# Patient Record
Sex: FEMALE | Race: BLACK OR AFRICAN AMERICAN | NOT HISPANIC OR LATINO | Employment: UNEMPLOYED | URBAN - METROPOLITAN AREA
[De-identification: names, ages, dates, MRNs, and addresses within clinical notes are randomized per-mention and may not be internally consistent; named-entity substitution may affect disease eponyms.]

---

## 2017-08-29 ENCOUNTER — OFFICE VISIT (OUTPATIENT)
Dept: NEUROLOGY | Facility: CLINIC | Age: 52
End: 2017-08-29
Payer: COMMERCIAL

## 2017-08-29 ENCOUNTER — LAB VISIT (OUTPATIENT)
Dept: LAB | Facility: HOSPITAL | Age: 52
End: 2017-08-29
Attending: PSYCHIATRY & NEUROLOGY
Payer: COMMERCIAL

## 2017-08-29 VITALS
HEART RATE: 68 BPM | WEIGHT: 165.38 LBS | HEIGHT: 66 IN | SYSTOLIC BLOOD PRESSURE: 124 MMHG | DIASTOLIC BLOOD PRESSURE: 93 MMHG | BODY MASS INDEX: 26.58 KG/M2

## 2017-08-29 DIAGNOSIS — R53.1 WEAKNESS: ICD-10-CM

## 2017-08-29 DIAGNOSIS — R41.89 COGNITIVE CHANGE: Primary | ICD-10-CM

## 2017-08-29 DIAGNOSIS — R41.89 COGNITIVE CHANGE: ICD-10-CM

## 2017-08-29 LAB
TSH SERPL DL<=0.005 MIU/L-ACNC: 0.41 UIU/ML
VIT B12 SERPL-MCNC: 559 PG/ML

## 2017-08-29 PROCEDURE — 84443 ASSAY THYROID STIM HORMONE: CPT

## 2017-08-29 PROCEDURE — 3080F DIAST BP >= 90 MM HG: CPT | Mod: S$GLB,,, | Performed by: PSYCHIATRY & NEUROLOGY

## 2017-08-29 PROCEDURE — 99205 OFFICE O/P NEW HI 60 MIN: CPT | Mod: S$GLB,,, | Performed by: PSYCHIATRY & NEUROLOGY

## 2017-08-29 PROCEDURE — 86592 SYPHILIS TEST NON-TREP QUAL: CPT

## 2017-08-29 PROCEDURE — 84425 ASSAY OF VITAMIN B-1: CPT

## 2017-08-29 PROCEDURE — 3074F SYST BP LT 130 MM HG: CPT | Mod: S$GLB,,, | Performed by: PSYCHIATRY & NEUROLOGY

## 2017-08-29 PROCEDURE — 3008F BODY MASS INDEX DOCD: CPT | Mod: S$GLB,,, | Performed by: PSYCHIATRY & NEUROLOGY

## 2017-08-29 PROCEDURE — 82607 VITAMIN B-12: CPT

## 2017-08-29 PROCEDURE — 36415 COLL VENOUS BLD VENIPUNCTURE: CPT

## 2017-08-29 PROCEDURE — 99999 PR PBB SHADOW E&M-NEW PATIENT-LVL III: CPT | Mod: PBBFAC,,, | Performed by: PSYCHIATRY & NEUROLOGY

## 2017-08-29 RX ORDER — LORAZEPAM 1 MG/1
TABLET ORAL
Qty: 2 TABLET | Refills: 0 | Status: SHIPPED | OUTPATIENT
Start: 2017-08-29

## 2017-08-29 NOTE — PATIENT INSTRUCTIONS
Magnetic Resonance Imaging (MRI)     You will be asked to hold very still during the scan.   Magnetic resonance imaging (MRI) is a test that lets your doctor see detailed pictures of the inside of your body. MRI combines the use of strong magnets and radio waves to form an MRI image.  How do I get ready for an MRI?  · You may need to stop eating or drinking before the test. Each health care facility has its own guidelines on this. It also depends on the type of exam you are having. Ask your health care provider if you should stop eating or drinking before the test.  · Ask your provider if you should stop taking any medicine before the test.  · Follow your normal daily routine unless your provider tells you otherwise.  · You'll be asked to remove your watch, jewelry, hearing aids, credit cards, pens, pocket knives, eyeglasses, and other metal objects.  · You may be asked to remove your makeup. Makeup may contain some metal.  · Most MRI tests take 30 to 60 minutes. Depending on the type of MRI you are having, the test may take longer. Give yourself extra time to check in.     MRI uses strong magnets. Metal is affected by magnets and can distort the image. The magnet used in MRI can cause metal objects in your body to move. If you have a metal implant, you may not be able to have an MRI unless the implant is certified as MRI safe. People with these implants should not have an MRI:  · Ear (cochlear) implants  · Certain clips used for brain aneurysms  · Certain metal coils put in blood vessels  · Most defibrillators  · Most pacemakers  Be sure to tell the radiologist or technologist if you:  · Have had any previous surgeries  · Have a pacemaker, surgical clips, metal plate or pins, an artificial joint, staples or screws, ear (cochlear) implants, or other implants  · Wear a medicated adhesive patch  · Have metal splinters in your body  · Have implanted nerve stimulators or drug-infusion ports  · Have tattoos or body  piercings. Some tattoo inks contain metal.  · Work with metal  · Have braces. You must remove any dental work.  · Have a bullet or other metal in your body  Also tell the radiologist or technologist if you:  · Are pregnant or think you may be  · Are afraid of small, enclosed spaces (claustrophobic)  · Are allergic to X-ray dye (contrast medium), iodine, shellfish, or any medicines  · Have other allergies  · Are breastfeeding  · Have a history of cancer  · Have any serious health problems. This includes kidney disease or a liver transplant. You may not be able to have the contrast material used for MRI.      What happens during an MRI?  · You may be asked to wear a hospital gown.  · You may be given earplugs to wear if you need them.  · You may be injected with a special dye (contrast) that improves the MRI image.   · Youll lie down on a platform that slides into the magnet.  What happens after an MRI?  · You can get back to normal activities right away. If you were given contrast, it will pass naturally through your body within a day. You may be told to drink more water or other fluids during this time.   · Your doctor will discuss the test results with you during a follow-up appointment or over the phone.  · Your next appointment is: __________________  Date Last Reviewed: 6/2/2015  © 0267-8573 [employer]. [address] [place] [address], [place] [address]. All rights reserved. This information is not intended as a substitute for professional medical care. Always follow your healthcare professional's instructions.

## 2017-08-29 NOTE — PROGRESS NOTES
[facility] - NEUROLOGY EPILEPSY  [facility] [place]    Date: August 29, 2017   Patient Name: Rebecca Colby   MRN: [id number]   PCP: David Mane  Referring Provider: Self, Aaareferral    Assessment:   Rebecca Colby is a 52 y.o. female presenting with cognitive changes, reported head drop and progressive daily muscle weakness/fatigue, depression, and hypertonia.  Will obtain new MRI brain for evaluation of reported prior stroke versus other underlying lesion.  We will also initiate screening labs as below to include myasthenia gravis panel.  Will also attempt to get copy of patient's prior MRI. Pending results of initial workup will likely consider referral to neuropsychiatry as well as possible LP versus expanded lab evaluation. Of note, TSH and PTH last checked 6 months ago and WNL.    Plan:     Problem List Items Addressed This Visit     None      Visit Diagnoses     Cognitive change    -  Primary    Relevant Orders    Vitamin B12 (Completed)    TSH (Completed)    RPR    MRI Brain W WO Contrast    Vitamin B1    Weakness        Relevant Orders    MYASTHENIA GRAVIS PANEL, ADULT          Mario Cartwright MD  [facility]   Department of Neurology    Patient note was created using Dragon Dictation.  Any errors in syntax or even information may not have been identified and edited on initial review prior to signing this note.  Subjective:        HPI:   Ms. Rebecca Colby is a 52 y.o. female who presents with a chief complaint of memory changes and weakness.  The patient presents with her daughter and  who contribute to the history.  The patient, who has a history of both breast cancer and lymphoma states that over the past several months, she feels as though her speech becomes more slurred throughout the day.  She states that she often feels as though she is going to stumble but is able to catch herself when she is walking.  She denies any actual falls , but  states that she feels stiff all over.  She does state that she is often leaning on objects for support.  She states that the end of the day, her muscles feel heavy and her head drops forward onto her chest and feels too heavy to lift.  She feels as though all of her muscles are exhausted at the end of the day, but denies fatigable chewing, dysarthria, dysphonia, dysphagia, diplopia, and ptosis.  She also endorses stress incontinence, stating that she occasionally urinates on herself when she is coughing or laughing.  Her daughter states that she does seem depressed and also notes that she has began laughing inappropriately and for no apparent reason.  She has established a relationship with a counselor recently as she has become more depressed and anxious and has stopped engaging in activities she previously enjoyed.  She is no longer driving with her daughter stating that she would most certainly get lost or involved in an accident if she did in both her daughter and  note that she has not been attending to the majority of her household tasks such as banking and other ADLs for several years, stating that she is too disorganized to complete most tasks.  He denies any family history of memory or mood disorders.  She does state that she underwent some form of brain scan approximately a year ago outside hospital.  She states that there was a question of a stroke on the MRI however, she is uncertain of the actual results.      PAST MEDICAL HISTORY:  Past Medical History:   Diagnosis Date    Breast cancer, left breast 04/2016    had lumpectomy    Cancer     neck s/p resectionopleted a course of chemo    Hypertension     Lymphoma 2007    had surgery & chemo at [facility]    Thyroid disease        PAST SURGICAL HISTORY:  Past Surgical History:   Procedure Laterality Date    BREAST LUMPECTOMY Left 04/2016    NECK SURGERY      resection of lymphoma    TUBAL LIGATION      tumerectomy      neck       CURRENT  "MEDS:  Current Outpatient Prescriptions   Medication Sig Dispense Refill    amlodipine (NORVASC) 5 MG tablet Take 1 tablet (5 mg total) by mouth once daily. 90 tablet 0    levothyroxine (SYNTHROID) 50 MCG tablet Take 1 tablet (50 mcg total) by mouth once daily. 90 tablet 0    losartan (COZAAR) 50 MG tablet Take 1 tablet (50 mg total) by mouth once daily. 90 tablet 0    lorazepam (ATIVAN) 1 MG tablet 1 tab 30 min prior to MRI, may repeat x1. Do not drive while taking this medication. 2 tablet 0     No current facility-administered medications for this visit.        ALLERGIES:  Review of patient's allergies indicates:   Allergen Reactions    Vicodin [hydrocodone-acetaminophen]        FAMILY HISTORY:  Family History   Problem Relation Age of Onset    Vision loss Father        SOCIAL HISTORY:  Social History   Substance Use Topics    Smoking status: Never Smoker    Smokeless tobacco: Never Used    Alcohol use No       Review of Systems:  12 review of systems is negative except for the symptoms mentioned in HPI.      Objective:     Vitals:    08/29/17 1325   BP: (!) 124/93   Pulse: 68   Weight: 75 kg (165 lb 5.5 oz)   Height: 5' 6" (1.676 m)     General: NAD, well nourished   Eyes: no tearing, discharge, no erythema   ENT: moist mucous membranes of the oral cavity, nares patent    Neck: Supple, full range of motion  Cardiovascular: Warm and well perfused, pulses equal and symmetrical  Lungs: Normal work of breathing, normal chest wall excursions  Skin: No rash, lesions, or breakdown on exposed skin  Psychiatry: Mood and affect are appropriate   Abdomen: soft, non tender, non distended  Extremeties: No cyanosis, clubbing or edema.    Neurological   MENTAL STATUS: Alert and oriented to person, place but not time. Attention and concentration within normal limits. Laughs inappropriately. Speech without dysarthria. Recent and remote memory within normal limits   CRANIAL NERVES: Visual fields intact. PERRL. EOMI. " Facial sensation intact. Face symmetrical. Hearing grossly intact. Full shoulder shrug bilaterally. Tongue protrudes midline   SENSORY: Sensation is intact to light touch throughout.   MOTOR: Normal bulk and increased tone in LUE.  5/5 deltoid, biceps, triceps, interosseous, hand  bilaterally. 5/5 iliopsoas, knee extension/flexion, foot dorsi/plantarflexion bilaterally.    REFLEXES: Symmetric and 2+ throughout.    CEREBELLAR/COORDINATION/GAIT: Gait steady with normal arm swing and stride length.  Heel to shin intact. Finger to nose intact. Anamika slowed bilaterally

## 2017-08-30 LAB — RPR SER QL: NORMAL

## 2017-09-01 ENCOUNTER — TELEPHONE (OUTPATIENT)
Dept: NEUROLOGY | Facility: CLINIC | Age: 52
End: 2017-09-01

## 2017-09-01 LAB — VIT B1 SERPL-MCNC: 62 UG/L (ref 38–122)

## 2017-09-07 ENCOUNTER — HOSPITAL ENCOUNTER (OUTPATIENT)
Dept: RADIOLOGY | Facility: HOSPITAL | Age: 52
Discharge: HOME | End: 2017-09-07
Attending: PSYCHIATRY & NEUROLOGY
Payer: COMMERCIAL

## 2017-09-07 DIAGNOSIS — R41.89 COGNITIVE CHANGE: ICD-10-CM

## 2017-09-07 PROCEDURE — 25500020 PHARM REV CODE 255: Performed by: PSYCHIATRY & NEUROLOGY

## 2017-09-07 PROCEDURE — 70553 MRI BRAIN STEM W/O & W/DYE: CPT | Mod: TC

## 2017-09-07 PROCEDURE — 70553 MRI BRAIN STEM W/O & W/DYE: CPT | Mod: 26,,, | Performed by: RADIOLOGY

## 2017-09-07 PROCEDURE — A9585 GADOBUTROL INJECTION: HCPCS | Performed by: PSYCHIATRY & NEUROLOGY

## 2017-09-07 RX ORDER — GADOBUTROL 604.72 MG/ML
8 INJECTION INTRAVENOUS
Status: COMPLETED | OUTPATIENT
Start: 2017-09-07 | End: 2017-09-07

## 2017-09-07 RX ADMIN — GADOBUTROL 8 ML: 604.72 INJECTION INTRAVENOUS at 06:09

## 2017-10-03 ENCOUNTER — OFFICE VISIT (OUTPATIENT)
Dept: NEUROLOGY | Facility: CLINIC | Age: 52
End: 2017-10-03
Payer: COMMERCIAL

## 2017-10-03 ENCOUNTER — LAB VISIT (OUTPATIENT)
Dept: LAB | Facility: HOSPITAL | Age: 52
End: 2017-10-03
Attending: PSYCHIATRY & NEUROLOGY
Payer: COMMERCIAL

## 2017-10-03 VITALS
SYSTOLIC BLOOD PRESSURE: 130 MMHG | HEART RATE: 68 BPM | DIASTOLIC BLOOD PRESSURE: 91 MMHG | HEIGHT: 66 IN | WEIGHT: 162.69 LBS | BODY MASS INDEX: 26.14 KG/M2

## 2017-10-03 DIAGNOSIS — R53.1 SUBJECTIVE WEAKNESS: ICD-10-CM

## 2017-10-03 DIAGNOSIS — F33.9 EPISODE OF RECURRENT MAJOR DEPRESSIVE DISORDER, UNSPECIFIED DEPRESSION EPISODE SEVERITY: ICD-10-CM

## 2017-10-03 DIAGNOSIS — G70.00 MYASTHENIA GRAVIS: Primary | ICD-10-CM

## 2017-10-03 PROCEDURE — 83519 RIA NONANTIBODY: CPT

## 2017-10-03 PROCEDURE — 36415 COLL VENOUS BLD VENIPUNCTURE: CPT

## 2017-10-03 PROCEDURE — 99999 PR PBB SHADOW E&M-EST. PATIENT-LVL III: CPT | Mod: PBBFAC,,, | Performed by: PSYCHIATRY & NEUROLOGY

## 2017-10-03 PROCEDURE — 99214 OFFICE O/P EST MOD 30 MIN: CPT | Mod: S$GLB,,, | Performed by: PSYCHIATRY & NEUROLOGY

## 2017-10-03 NOTE — PROGRESS NOTES
[facility] - NEUROLOGY EPILEPSY  [facility] [place]    Date: October 3, 2017   Patient Name: Rebecca Colby   MRN: [id number]   PCP: David Mane  Referring Provider: David Mane MD    Assessment:   Rebecca Colby is a 52 y.o. female presenting in follow-up for transient cognitive changes, transient progressive muscle weakness, and transient hypertonia, all of which have largely fully resolved.  I personally reviewed the patient's MRI brain and reviewed with the patient and found to be normal.  Screening labs for cognitive changes including B12, TSH, RPR, and B1 were all within normal limits.  For unclear reasons, the patient's myasthenia panel was not completed.  We will reorder this today.  The patient endorses significant psychosocial stressors and states that her symptoms seem to correlate drastically with her symptoms.  She is now following with a counselor and states that she plans to discuss antidepressant therapy with her PCP in the near future.    Plan:     Problem List Items Addressed This Visit        Psychiatric    Episode of recurrent major depressive disorder    Current Assessment & Plan     -- patient following with counselor  -- patient to talk to PCP about resuming an antidepressant            Other    Subjective weakness    Relevant Orders    MYASTHENIA GRAVIS PANEL, ADULT      Other Visit Diagnoses    None.       Mario Cartwright MD  [facility]   Department of Neurology    Patient note was created using Dragon Dictation.  Any errors in syntax or even information may not have been identified and edited on initial review prior to signing this note.  Subjective:        HPI:   Ms. Rebecca Colby is a 52 y.o. female who presents with a chief complaint of memory changes and weakness. The patient presents today with her daughter who contributes to the history. Please see my previous note for details regarding the  patient's initial presentation.  Together they report that the patient has markedly improved since her last visit.  They state that the patient's memory seems intact and she has been able to remember both recent and remote details that other family members have not been able to recall.  She is not having any difficulty caring for herself. She states her progressive weakness has largely resolved and denies any further episodes of head drop Her daughter reports that she is intermittently asking for help when dressing such as putting on a jacket but most of the time she is able to do it independently. 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 She also cites her history of breast cancer and her daughter's diagnosis of breast cancer as recent stressors. She has also recently started seeing a marriage counselor and she and her daughter state they have considered having her talk to her PCP about restarting an antidepressant, which she has taken in the past.     PAST MEDICAL HISTORY:  Past Medical History:   Diagnosis Date    Breast cancer, left breast 04/2016    had lumpectomy    Cancer     neck s/p resectionopleted a course of chemo    Hypertension     Lymphoma 2007    had surgery & chemo at [facility]    Thyroid disease        PAST SURGICAL HISTORY:  Past Surgical History:   Procedure Laterality Date    BREAST LUMPECTOMY Left 04/2016    NECK SURGERY      resection of lymphoma    TUBAL LIGATION      tumerectomy      neck       CURRENT MEDS:  Current Outpatient Prescriptions   Medication Sig Dispense Refill    amlodipine (NORVASC) 5 MG tablet Take 1 tablet (5 mg total) by mouth once daily. 90 tablet 0    levothyroxine (SYNTHROID) 50 MCG tablet Take 1 tablet (50 mcg total) by mouth once daily. 90 tablet 0    losartan (COZAAR) 50 MG tablet Take 1 tablet (50 mg total) by mouth once daily. 90 tablet 0  "   lorazepam (ATIVAN) 1 MG tablet 1 tab 30 min prior to MRI, may repeat x1. Do not drive while taking this medication. 2 tablet 0     No current facility-administered medications for this visit.      ALLERGIES:  Review of patient's allergies indicates:   Allergen Reactions    Vicodin [hydrocodone-acetaminophen]        FAMILY HISTORY:  Family History   Problem Relation Age of Onset    Vision loss Father        SOCIAL HISTORY:  Social History   Substance Use Topics    Smoking status: Never Smoker    Smokeless tobacco: Never Used    Alcohol use No       Review of Systems:  12 review of systems is negative except for the symptoms mentioned in HPI.      Objective:     Vitals:    10/03/17 1358   BP: (!) 130/91   Pulse: 68   Weight: 73.8 kg (162 lb 11.2 oz)   Height: 5' 6" (1.676 m)     General: NAD, well nourished   Eyes: no tearing, discharge, no erythema   ENT: moist mucous membranes of the oral cavity, nares patent    Neck: Supple, full range of motion  Cardiovascular: Warm and well perfused, pulses equal and symmetrical  Lungs: Normal work of breathing, normal chest wall excursions  Skin: No rash, lesions, or breakdown on exposed skin  Psychiatry: Mood and affect are appropriate   Abdomen: soft, non tender, non distended  Extremeties: No cyanosis, clubbing or edema.    Neurological   MENTAL STATUS: Alert and oriented to person, place but not time. Attention and concentration within normal limits. Laughs inappropriately. Speech without dysarthria. Recent and remote memory within normal limits   CRANIAL NERVES: Visual fields intact. PERRL. EOMI. Facial sensation intact. Face symmetrical. Hearing grossly intact. Full shoulder shrug bilaterally. Tongue protrudes midline   SENSORY: Sensation is intact to light touch throughout.   MOTOR: Normal bulk and increased tone in LUE.  5/5 deltoid, biceps, triceps, interosseous, hand  bilaterally. 5/5 iliopsoas, knee extension/flexion, foot dorsi/plantarflexion " bilaterally.    REFLEXES: Symmetric and 2+ throughout.    CEREBELLAR/COORDINATION/GAIT: Gait steady with normal arm swing and stride length.  Heel to shin intact. Finger to nose intact. Anamika slowed bilaterally

## 2017-10-03 NOTE — PATIENT INSTRUCTIONS
Manage Stress with a Healthy Lifestyle  Managing stress is easier if you take good care of yourself. Make time for rest and recreation. Eat healthier meals. Take a walk now and then. And dont forget to treat yourself. A little down time can go a long way.    Get enough rest  When you dont get enough sleep, you may be too tired to cope with stress. Also, stress can prevent you from sleeping well or may keep you awake. If this happens to you, try reading or listening to soothing music before you go to sleep.  Make time for yourself  In todays world, there is often too much to do in too little time. It may seem hard to make time for yourself. But try to spend just a few minutes each day doing something you enjoy. This can improve the quality of your life and your mental outlook. Also, youll be more productive when handling your day-to-day duties. And youll be in a better frame of mind to cope with stress.  Eat right  Its easy to react to stress by reaching for a bag of chips or a cup of coffee. This may give you a quick boost but may later drain your energy. To keep your energy level steady, eat healthy meals and snacks at home and at work. Try not to skip meals. Stick with a low-fat diet thats rich in whole grains and fresh fruits and vegetables.  Nourish your spirit  When life is hectic, its easy to forget what your values and goals are. To help prevent this from happening, find out what is most important in your life. Ask yourself, What would I miss most if I had to start a new life alone somewhere else? My work? My family or friends? Something I love doing? Then focus on embracing your values and what you want to achieve in your life.  Stay on the move  Exercise helps burn off the negative energy of stress. Doing something active that you enjoy also helps you get away from stressful situations. Try to walk, jog, skate, swim, dance, take a fitness class, or play a team sport on most days. Or practice  yoga or nancy chi, which can help you relax.  Put some fun into your life  Some things you may enjoy doing may be listed below. If not, try some of these. Then add your own.  · Go see a movie  · Have lunch with a friend  · Learn a new sport or game  · Plan a fun trip  · Take a class on something you always wanted to learn  · Try a new hobby   Date Last Reviewed: 1/18/2017  © 9143-6811 [employer]. [address] [place] [address], [place] [address]. All rights reserved. This information is not intended as a substitute for professional medical care. Always follow your healthcare professional's instructions.

## 2017-10-03 NOTE — LETTER
October 3, 2017      David Mane MD  [address] [place] [address]           [facility] - Neurology Epilepsy  [address], [address]  [facility] [address]  Phone: [phone]  Fax: [phone]          Patient: Rebecca Colby   MR Number: [id number]   YOB: 1965   Date of Visit: 10/3/2017       Dear Dr. David Mane:    Thank you for referring Rebecca Colby to me for evaluation. Attached you will find relevant portions of my assessment and plan of care.    If you have questions, please do not hesitate to call me. I look forward to following Rebecca Colby along with you.    Sincerely,    Mario Cartwright MD    Enclosure  CC:  No Recipients    If you would like to receive this communication electronically, please contact [email or web address] or [phone] to request more information on [employer] access.    For providers and/or their staff who would like to refer a patient to [facility], please contact us through our one-stop-shop provider referral line, [facility], at [phone].    If you feel you have received this communication in error or would no longer like to receive these types of communications, please e-mail [email or web address]

## 2017-10-09 LAB
ACHR BIND AB SER-SCNC: 0.4 NMOL/L
ACHR MOD AB/ACHR TOTAL SFR SER: 0 %
STRIA MUS AB TITR SER: NEGATIVE TITER
VGCC-N BIND AB SER-SCNC: ABNORMAL PMOL/L

## 2017-10-19 ENCOUNTER — TELEPHONE (OUTPATIENT)
Dept: NEUROLOGY | Facility: CLINIC | Age: 52
End: 2017-10-19

## 2017-10-19 NOTE — TELEPHONE ENCOUNTER
I called the patient and left a message for the patient to reschedule the test that were ordered by Dr Cartwright at a [facility] appointment.

## 2017-11-01 ENCOUNTER — PROCEDURE VISIT (OUTPATIENT)
Dept: NEUROLOGY | Facility: CLINIC | Age: 52
End: 2017-11-01
Payer: COMMERCIAL

## 2017-11-01 DIAGNOSIS — G70.00 MYASTHENIA GRAVIS: ICD-10-CM

## 2017-11-01 PROCEDURE — 95937 NEUROMUSCULAR JUNCTION TEST: CPT | Mod: S$GLB,,, | Performed by: PSYCHIATRY & NEUROLOGY

## 2017-11-01 RX ORDER — PYRIDOSTIGMINE BROMIDE 60 MG/1
60 TABLET ORAL EVERY 4 HOURS
Qty: 90 TABLET | Refills: 3 | Status: SHIPPED | OUTPATIENT
Start: 2017-11-01 | End: 2024-02-05

## 2017-11-01 NOTE — PROCEDURES
Procedures   Please see scanned NCS/EMG procedure note    Benito Limon MD  [facility]

## 2023-05-08 ENCOUNTER — OFFICE VISIT (OUTPATIENT)
Dept: PODIATRY | Facility: CLINIC | Age: 58
End: 2023-05-08
Payer: COMMERCIAL

## 2023-05-08 VITALS
SYSTOLIC BLOOD PRESSURE: 135 MMHG | WEIGHT: 168.31 LBS | HEIGHT: 65 IN | HEART RATE: 91 BPM | DIASTOLIC BLOOD PRESSURE: 90 MMHG | BODY MASS INDEX: 28.04 KG/M2

## 2023-05-08 DIAGNOSIS — L29.9 ITCHING: ICD-10-CM

## 2023-05-08 DIAGNOSIS — B35.1 ONYCHOMYCOSIS OF TOENAIL: ICD-10-CM

## 2023-05-08 DIAGNOSIS — B35.3 TINEA PEDIS, UNSPECIFIED LATERALITY: Primary | ICD-10-CM

## 2023-05-08 PROBLEM — Z85.3 HISTORY OF BREAST CANCER: Status: ACTIVE | Noted: 2018-12-11

## 2023-05-08 PROBLEM — G20.A1 PARKINSON'S DISEASE: Status: ACTIVE | Noted: 2022-10-28

## 2023-05-08 PROBLEM — Z17.0 ESTROGEN RECEPTOR POSITIVE: Status: ACTIVE | Noted: 2019-01-07

## 2023-05-08 PROBLEM — Z85.71 HISTORY OF HODGKIN'S LYMPHOMA: Status: ACTIVE | Noted: 2018-12-11

## 2023-05-08 PROCEDURE — 99999 PR PBB SHADOW E&M-NEW PATIENT-LVL III: ICD-10-PCS | Mod: PBBFAC,,, | Performed by: PODIATRIST

## 2023-05-08 PROCEDURE — 3075F SYST BP GE 130 - 139MM HG: CPT | Mod: CPTII,S$GLB,, | Performed by: PODIATRIST

## 2023-05-08 PROCEDURE — 3075F PR MOST RECENT SYSTOLIC BLOOD PRESS GE 130-139MM HG: ICD-10-PCS | Mod: CPTII,S$GLB,, | Performed by: PODIATRIST

## 2023-05-08 PROCEDURE — 3008F BODY MASS INDEX DOCD: CPT | Mod: CPTII,S$GLB,, | Performed by: PODIATRIST

## 2023-05-08 PROCEDURE — 3080F PR MOST RECENT DIASTOLIC BLOOD PRESSURE >= 90 MM HG: ICD-10-PCS | Mod: CPTII,S$GLB,, | Performed by: PODIATRIST

## 2023-05-08 PROCEDURE — 1159F PR MEDICATION LIST DOCUMENTED IN MEDICAL RECORD: ICD-10-PCS | Mod: CPTII,S$GLB,, | Performed by: PODIATRIST

## 2023-05-08 PROCEDURE — 99202 OFFICE O/P NEW SF 15 MIN: CPT | Mod: S$GLB,,, | Performed by: PODIATRIST

## 2023-05-08 PROCEDURE — 4010F PR ACE/ARB THEARPY RXD/TAKEN: ICD-10-PCS | Mod: CPTII,S$GLB,, | Performed by: PODIATRIST

## 2023-05-08 PROCEDURE — 4010F ACE/ARB THERAPY RXD/TAKEN: CPT | Mod: CPTII,S$GLB,, | Performed by: PODIATRIST

## 2023-05-08 PROCEDURE — 1159F MED LIST DOCD IN RCRD: CPT | Mod: CPTII,S$GLB,, | Performed by: PODIATRIST

## 2023-05-08 PROCEDURE — 3008F PR BODY MASS INDEX (BMI) DOCUMENTED: ICD-10-PCS | Mod: CPTII,S$GLB,, | Performed by: PODIATRIST

## 2023-05-08 PROCEDURE — 99999 PR PBB SHADOW E&M-NEW PATIENT-LVL III: CPT | Mod: PBBFAC,,, | Performed by: PODIATRIST

## 2023-05-08 PROCEDURE — 99202 PR OFFICE/OUTPT VISIT, NEW, LEVL II, 15-29 MIN: ICD-10-PCS | Mod: S$GLB,,, | Performed by: PODIATRIST

## 2023-05-08 PROCEDURE — 3080F DIAST BP >= 90 MM HG: CPT | Mod: CPTII,S$GLB,, | Performed by: PODIATRIST

## 2023-05-08 RX ORDER — HYDROCHLOROTHIAZIDE 12.5 MG/1
12.5 TABLET ORAL
COMMUNITY
Start: 2023-03-08

## 2023-05-08 RX ORDER — CARBIDOPA AND LEVODOPA 25; 100 MG/1; MG/1
1 TABLET ORAL 3 TIMES DAILY
COMMUNITY
Start: 2023-04-11

## 2023-05-08 RX ORDER — CLOTRIMAZOLE AND BETAMETHASONE DIPROPIONATE 10; .64 MG/G; MG/G
CREAM TOPICAL 2 TIMES DAILY
Qty: 45 G | Refills: 2 | Status: SHIPPED | OUTPATIENT
Start: 2023-05-08

## 2023-05-08 NOTE — PATIENT INSTRUCTIONS
For your fungal(mycotic) nails, pick one & use for at least 3-6 months:    1. Listerine mouthwash (yellow/gold) - soak for 5-10minutes daily (may re-use solution up to a week)    2. Vicks Vaporub to nails daily after a bath/end of day/bedtime.    3. Lamisil (terbanafine) 1% antifungal cream daily to nails after shower.         Gentian violet between toes daily after bathing until resolved (followed by Lotrisjenifer), then weekly only

## 2023-05-08 NOTE — PROGRESS NOTES
Subjective:      Patient ID: Rebecca Colby is a 58 y.o. female.    Chief Complaint: Foot Problem (Fungus Bi-Lat )    Rebecca is a 58 y.o. female who presents to the clinic complaining of thick and dark discolored toenails on both feet as well as itching between the toes @ night. Rebecca is inquiring about treatment options.    PCP: Simeon Onofre MD 3 months ago     Past Medical History:   Diagnosis Date    Breast cancer, left breast 04/2016    had lumpectomy    Cancer     neck s/p resectionopleted a course of chemo    Hypertension     Lymphoma 2007    had surgery & chemo at [facility]    Thyroid disease      Patient Active Problem List   Diagnosis    Lymphoma    Breast cancer    Essential hypertension    Acquired hypothyroidism    Subjective weakness    Episode of recurrent major depressive disorder    Estrogen receptor positive    History of breast cancer    History of Hodgkin's lymphoma    Parkinson's disease      Objective:      Review of Systems   Constitutional: Negative for malaise/fatigue.   Skin:  Positive for color change, dry skin, itching, nail changes and rash. Negative for suspicious lesions.   Musculoskeletal:  Negative for falls, joint pain and myalgias.   Psychiatric/Behavioral:  The patient is not nervous/anxious.    Physical Exam  Vitals reviewed.   Constitutional:       General: She is not in acute distress.     Appearance: She is well-developed and overweight.   Cardiovascular:      Pulses:           Dorsalis pedis pulses are 2+ on the right side and 2+ on the left side.   Feet:      Right foot:      Skin integrity: Dry skin present.      Toenail Condition: Fungal disease present.     Left foot:      Skin integrity: Dry skin present.      Toenail Condition: Fungal disease present.     Comments: Toenails are thickened, discolored dark, w/ crumbly subungual debris sparing 3rd B/L and 2nd.   Skin:     General: Skin is warm and dry.      Capillary Refill: Capillary refill takes less than 2  seconds.      Findings: Rash present. No bruising, erythema or lesion.      Comments: Mild scaling and maceration intertriginous spaces B/L.   Neurological:      Mental Status: She is alert and oriented to person, place, and time.      Sensory: No sensory deficit.      Motor: No weakness.      Gait: Gait normal.   Psychiatric:         Mood and Affect: Mood and affect normal. Mood is not anxious.         Behavior: Behavior is cooperative.       Assessment:      Encounter Diagnoses   Name Primary?    Tinea pedis, unspecified laterality Yes    Itching     Onychomycosis of toenail      Problem List Items Addressed This Visit    None  Visit Diagnoses       Tinea pedis, unspecified laterality    -  Primary    Relevant Medications    clotrimazole-betamethasone 1-0.05% (LOTRISONE) cream    Itching        Relevant Medications    clotrimazole-betamethasone 1-0.05% (LOTRISONE) cream    Onychomycosis of toenail                 Plan:       Rebecca was seen today for foot problem.    Diagnoses and all orders for this visit:    Tinea pedis, unspecified laterality  -     clotrimazole-betamethasone 1-0.05% (LOTRISONE) cream; Apply topically 2 (two) times daily.    Itching  -     clotrimazole-betamethasone 1-0.05% (LOTRISONE) cream; Apply topically 2 (two) times daily.    Onychomycosis of toenail    I counseled the patient on her conditions, their implications and medical management.    - Shoe inspection. Patient instructed on proper foot hygeine.   Instructions provided on OTC topical antifungal treatment options for her nails and skin.  Gentian violet applied to the webspace is and patient may continue to do this daily after bathing (after which she may apply weekly p.r.n.)        A total of 19 mins.was spent on chart review, patient visit & documentation.

## 2024-02-05 ENCOUNTER — OFFICE VISIT (OUTPATIENT)
Dept: OBSTETRICS AND GYNECOLOGY | Facility: CLINIC | Age: 59
End: 2024-02-05
Payer: COMMERCIAL

## 2024-02-05 VITALS
WEIGHT: 160.5 LBS | DIASTOLIC BLOOD PRESSURE: 103 MMHG | BODY MASS INDEX: 26.74 KG/M2 | HEIGHT: 65 IN | SYSTOLIC BLOOD PRESSURE: 136 MMHG

## 2024-02-05 DIAGNOSIS — Z12.4 SCREENING FOR CERVICAL CANCER: ICD-10-CM

## 2024-02-05 DIAGNOSIS — Z01.419 WELL WOMAN EXAM WITH ROUTINE GYNECOLOGICAL EXAM: Primary | ICD-10-CM

## 2024-02-05 PROCEDURE — 87624 HPV HI-RISK TYP POOLED RSLT: CPT | Performed by: OBSTETRICS & GYNECOLOGY

## 2024-02-05 PROCEDURE — 3080F DIAST BP >= 90 MM HG: CPT | Mod: CPTII,S$GLB,, | Performed by: OBSTETRICS & GYNECOLOGY

## 2024-02-05 PROCEDURE — 99999 PR PBB SHADOW E&M-EST. PATIENT-LVL III: CPT | Mod: PBBFAC,,, | Performed by: OBSTETRICS & GYNECOLOGY

## 2024-02-05 PROCEDURE — 99386 PREV VISIT NEW AGE 40-64: CPT | Mod: S$GLB,,, | Performed by: OBSTETRICS & GYNECOLOGY

## 2024-02-05 PROCEDURE — 3008F BODY MASS INDEX DOCD: CPT | Mod: CPTII,S$GLB,, | Performed by: OBSTETRICS & GYNECOLOGY

## 2024-02-05 PROCEDURE — 1159F MED LIST DOCD IN RCRD: CPT | Mod: CPTII,S$GLB,, | Performed by: OBSTETRICS & GYNECOLOGY

## 2024-02-05 PROCEDURE — 88175 CYTOPATH C/V AUTO FLUID REDO: CPT | Performed by: OBSTETRICS & GYNECOLOGY

## 2024-02-05 PROCEDURE — 3075F SYST BP GE 130 - 139MM HG: CPT | Mod: CPTII,S$GLB,, | Performed by: OBSTETRICS & GYNECOLOGY

## 2024-02-05 PROCEDURE — 4010F ACE/ARB THERAPY RXD/TAKEN: CPT | Mod: CPTII,S$GLB,, | Performed by: OBSTETRICS & GYNECOLOGY

## 2024-02-05 RX ORDER — INFLUENZA VIRUS VACCINE 15; 15; 15; 15 UG/.5ML; UG/.5ML; UG/.5ML; UG/.5ML
SUSPENSION INTRAMUSCULAR
COMMUNITY
Start: 2023-11-10

## 2024-02-05 NOTE — PROGRESS NOTES
"  Subjective:      Patient ID: Rebecca Colby is a 58 y.o. female.    Chief Complaint:  Well Woman      History of Present Illness  HPI  57yo  presents for annual exam.  No complaints today, but states her PCP told her to see a gyn due to low blood count and abnormal thyroid.  Does not have records with her, and none available through [facility] Everywhere.  Menopausal since age 50.  No PMB.  Sexually active without complaints. Occ constipation, relieved with diet changes.  Last pap over 5 years ago.  MMG at the end of Feb (with PCP, will request records).     Elevated BP in clinic 157/107, did not take evening Norvasc the last 2 nights due to forgetting, has been dealing with death of several loved ones recently.     GYN & OB History  No LMP recorded. (Menstrual status: Other).   Date of Last Pap: No result found    OB History    Para Term  AB Living   2 2 2     2   SAB IAB Ectopic Multiple Live Births           2      # Outcome Date GA Lbr Joshua/2nd Weight Sex Delivery Anes PTL Lv   2 Term     F Vag-Spont   SHARMAINE   1 Term     M Vag-Spont   SHARMAINE       Review of Systems  Review of Systems   Constitutional:  Negative for chills and fever.   HENT:  Negative for nasal congestion.    Respiratory:  Negative for cough and shortness of breath.    Cardiovascular:  Negative for chest pain.   Gastrointestinal:  Positive for constipation (occ). Negative for abdominal pain and diarrhea.   Genitourinary:  Negative for dyspareunia, vaginal discharge, postmenopausal bleeding and vaginal odor.   Musculoskeletal:  Negative for myalgias.   Integumentary:  Negative for rash.   Breast: Negative for breast self exam         Objective:     Physical Exam    BP (!) 136/103   Ht 5' 5" (1.651 m)   Wt 72.8 kg (160 lb 7.9 oz)   BMI 26.71 kg/m²     Gen: NAD  Resp: Normal respiratory effort  Breast: Symmetric, nontender.  No masses.  No skin changes.  No nipple discharge.   Abd: soft, NT  Pelvic: Normal-appearing external " female genitalia.  No CMT.  Uterus small, mobile, nontender.  No adnexal masses or tenderness.    Ext: normal ROM  Psych: appropriate affect  Neuro: grossly intact    Assessment:     Rebecca was seen today for well woman.    Diagnoses and all orders for this visit:    Well woman exam with routine gynecological exam    Screening for cervical cancer  -     Liquid-Based Pap Smear, Screening  -     HPV High Risk Genotypes, PCR           Plan:     Routine annual exam with pap smear and breast exam today.  Declines STD screening, states done with PCP.  Will request records from PCP regarding labs, but discussed exam and no h/o vaginal bleeding - low blood count not likely due to gyn pathology.  Encouraged f/u with PCP, possible GI referral.  Up to date on screening colonoscopy.   Counseling done, precautions given, all questions answered.  RTC 1 year, or prn.

## 2024-02-08 LAB
FINAL PATHOLOGIC DIAGNOSIS: NORMAL
Lab: NORMAL

## 2024-02-21 LAB
HPV HR 12 DNA SPEC QL NAA+PROBE: NEGATIVE
HPV16 AG SPEC QL: NEGATIVE
HPV18 DNA SPEC QL NAA+PROBE: NEGATIVE

## 2025-02-18 ENCOUNTER — TELEPHONE (OUTPATIENT)
Facility: CLINIC | Age: 60
End: 2025-02-18
Payer: COMMERCIAL

## 2025-02-18 NOTE — TELEPHONE ENCOUNTER
----- Message from Kaley sent at 2/17/2025  2:24 PM CST -----  Name of Who is Calling: Marcelo ( daughter)  What is the request in detail: Calling to set up a appt with DR. Trejo Please give the daughter a call back for scheduling.   Can the clinic reply by [facility]:  What Number to Call Back if not in [facility]: [phone]

## 2025-03-17 ENCOUNTER — OFFICE VISIT (OUTPATIENT)
Facility: CLINIC | Age: 60
End: 2025-03-17
Payer: COMMERCIAL

## 2025-03-17 VITALS
WEIGHT: 160 LBS | HEIGHT: 66 IN | DIASTOLIC BLOOD PRESSURE: 92 MMHG | SYSTOLIC BLOOD PRESSURE: 131 MMHG | BODY MASS INDEX: 25.71 KG/M2 | HEART RATE: 74 BPM

## 2025-03-17 DIAGNOSIS — G20.A1 PARKINSON'S DISEASE WITHOUT DYSKINESIA OR FLUCTUATING MANIFESTATIONS: Primary | ICD-10-CM

## 2025-03-17 DIAGNOSIS — R49.8 HYPOPHONIA: ICD-10-CM

## 2025-03-17 PROCEDURE — 3008F BODY MASS INDEX DOCD: CPT | Mod: CPTII,S$GLB,, | Performed by: STUDENT IN AN ORGANIZED HEALTH CARE EDUCATION/TRAINING PROGRAM

## 2025-03-17 PROCEDURE — 1159F MED LIST DOCD IN RCRD: CPT | Mod: CPTII,S$GLB,, | Performed by: STUDENT IN AN ORGANIZED HEALTH CARE EDUCATION/TRAINING PROGRAM

## 2025-03-17 PROCEDURE — G2211 COMPLEX E/M VISIT ADD ON: HCPCS | Mod: S$GLB,,, | Performed by: STUDENT IN AN ORGANIZED HEALTH CARE EDUCATION/TRAINING PROGRAM

## 2025-03-17 PROCEDURE — 4010F ACE/ARB THERAPY RXD/TAKEN: CPT | Mod: CPTII,S$GLB,, | Performed by: STUDENT IN AN ORGANIZED HEALTH CARE EDUCATION/TRAINING PROGRAM

## 2025-03-17 PROCEDURE — 3080F DIAST BP >= 90 MM HG: CPT | Mod: CPTII,S$GLB,, | Performed by: STUDENT IN AN ORGANIZED HEALTH CARE EDUCATION/TRAINING PROGRAM

## 2025-03-17 PROCEDURE — 99999 PR PBB SHADOW E&M-EST. PATIENT-LVL III: CPT | Mod: PBBFAC,,, | Performed by: STUDENT IN AN ORGANIZED HEALTH CARE EDUCATION/TRAINING PROGRAM

## 2025-03-17 PROCEDURE — 99204 OFFICE O/P NEW MOD 45 MIN: CPT | Mod: S$GLB,,, | Performed by: STUDENT IN AN ORGANIZED HEALTH CARE EDUCATION/TRAINING PROGRAM

## 2025-03-17 PROCEDURE — 3075F SYST BP GE 130 - 139MM HG: CPT | Mod: CPTII,S$GLB,, | Performed by: STUDENT IN AN ORGANIZED HEALTH CARE EDUCATION/TRAINING PROGRAM

## 2025-03-17 RX ORDER — CARBIDOPA AND LEVODOPA 25; 100 MG/1; MG/1
1 TABLET ORAL 3 TIMES DAILY
Qty: 270 TABLET | Refills: 3 | Status: SHIPPED | OUTPATIENT
Start: 2025-03-17 | End: 2026-03-17

## 2025-03-17 NOTE — PROGRESS NOTES
Name: Rebecca Colby  MRN: [id number]   CSN: [id number]      Date: 03/17/2025    Referring physician:  Tulio Huang MD  [address] [facility]  6th [address]  [place] [address]    Chief Complaint / Interval History: PD    History of Present Illness (HPI):    Ms. Colby is a 59 yo RH woman with history of Hodgkin lymphoma, breast cancer and HTN who is here to establish care for PD. Previously saw Dr. Huang.     Her symptom onset began with head drop, weakness and hypertonia in 2016 followed by her diagnosis in 2017. Things had not progressed very much at her last visit and her Sinemet has worked well for her.     More left sided symptoms. Speech is affected.     Sometimes she has abnormal movements involving the mouth. Short term memory is poor.     She has lost weight and not acting herself. 10lb loss - not eating much during the day. Very agreeable which is a bit different for her per daughter.     Feels best in the morning.     Current Mvmt Medications:  Sinemet 25/100 1 tab daily (AM) - sometimes BID if needed  - gives her energy   - wakes up 6-7am, bedtime at 12-1am     Prior Mvmt Medication Trials:    Nonmotor ROS:  Smell/Taste: no issues  Voice/Swallowing: voice is quiet, dysphagia not constant issue   Gait/Falls: no falls, some shuffling  Exercise: occasional walking   Dizziness: none  Hydration: does well with this   Urinary Issues: was having incontinence  - stress incontinence   Constipation: none - dietary control   Sleep/RBD: talks in her sleep  Hallucinations/Peripheral Illusions: none   Memory/Cognition/Language: short term memory  - went back to school last year   Mood: can control her moods  - can't cry     Past Medical History: The patient  has a past medical history of Breast cancer, left breast (04/2016), Cancer, Hypertension, Lymphoma (2007), and Thyroid disease.     Relevant Surgical History:   Past Surgical History:   Procedure Laterality Date    BREAST LUMPECTOMY Left  "04/2016    NECK SURGERY      resection of lymphoma    TUBAL LIGATION      tumerectomy      neck       Social History: The patient  reports that she has never smoked. She has never used smokeless tobacco. She reports that she does not drink alcohol and does not use drugs. No family history of PD.     Family History: Their family history includes Vision loss in her father.    Allergies: Vibramycin [doxycycline calcium] and Vicodin [hydrocodone-acetaminophen]     Meds: Medications Ordered Prior to Encounter[1]    Exam:  BP (!) 131/92   Pulse 74   Ht 5' 6" (1.676 m)   Wt 72.6 kg (160 lb)   BMI 25.82 kg/m²     Constitutional  Well-developed, well-nourished, appears stated age   Cardiovascular  No LE edema bilaterally   Neurological    * Mental status  MOCA = not done during today's visit     - Orientation  Oriented to conversation     - Memory   Intact recent and remote     - Attention/concentration  Attentive, vigilant during exam     - Language  Intact to conversation.     - Fund of knowledge  Aware of current events     - Executive  Well-organized thoughts     - Other     * Cranial nerves       - CN II  Pupils equal, visual fields full to confrontation     - CN III, IV, VI  Extraocular movements full, normal pursuits and saccades         - CN VII  Face strong and symmetric bilaterally     - CN VIII  Hearing intact bilaterally         - CN XI  SCM and trapezius 5/5 bilaterally (slightly asymmetric)       * Motor  Grossly intact       * Coordination  No dysmetria with finger-to-nose    * Gait  See below.       * Specialized movement exam Gen: masked facies and reduced blink   Speech: hypophonic  Tremor: mild intermittent left hand rest tremor   Bradykinesia: mild slowness with some decrement   Tone: slightly increased on the left   Gait: reduced arm sing left greater than right, stride length looks good, turns well, posture ok      Right head tilt      Medical Record Review:  Labs, imaging and prior notes reviewed " independently.       Diagnoses:          1. Parkinson's disease without dyskinesia or fluctuating manifestations        2. Hypophonia            Assessment:  Ms. Colby is a 59 yo RH woman with PD who presents to [facility] care. She has only been on Sinemet 1 tab daily and felt it did provide her additional energy and allowed her to move around easier. She does appear parkinsonian on exam and is likely under medicated. Will begin with the following plan:     Plan:  - For parkinsonism, would increase to Sinemet 1 tab TID to get better coverage of her symptoms.   - Pending her response to TID dosing I may set her up with PD research and multi-D PD clnic. If no response to increase dose we may consider syn-one or MYESHA.   - Reported mouth movements - ask about neuroleptics next visit.   - For hypophonia, begin with use of the breather for now. May consider ST next visit.   - Discussed importance of regular exercise and well balanced diet.     The visit today is associated with current or anticipated ongoing medical care related to this patients complex condition. Patient should RTC in 3 months to see me virtually.     Jayde Torres MD  Division of Movement and Memory Disorders  [facility]  [phone]           [1]   Current Outpatient Medications on File Prior to Visit   Medication Sig Dispense Refill    amlodipine (NORVASC) 5 MG tablet Take 1 tablet (5 mg total) by mouth once daily. 90 tablet 0    clotrimazole-betamethasone 1-0.05% (LOTRISONE) cream Apply topically 2 (two) times daily. 45 g 2    FLUARIX QUAD 1435-0891, PF, 60 mcg (15 mcg x 4)/0.5 mL Syrg       hydroCHLOROthiazide (HYDRODIURIL) 12.5 MG Tab Take 12.5 mg by mouth.      levothyroxine (SYNTHROID) 50 MCG tablet Take 1 tablet (50 mcg total) by mouth once daily. 90 tablet 0    lorazepam (ATIVAN) 1 MG tablet 1 tab 30 min prior to MRI, may repeat x1. Do not drive while taking this medication. 2 tablet 0    losartan (COZAAR) 50 MG tablet  Take 1 tablet (50 mg total) by mouth once daily. 90 tablet 0    [DISCONTINUED] carbidopa-levodopa  mg (SINEMET)  mg per tablet Take 1 tablet by mouth 3 (three) times daily.       No current facility-administered medications on file prior to visit.

## 2025-03-17 NOTE — PATIENT INSTRUCTIONS
The breather.     Try taking Sinemet 1 tab three times/daily. Take right when you wake up, middle of the day and then again around 5-6pm.